# Patient Record
Sex: MALE | Race: BLACK OR AFRICAN AMERICAN | Employment: FULL TIME | URBAN - METROPOLITAN AREA
[De-identification: names, ages, dates, MRNs, and addresses within clinical notes are randomized per-mention and may not be internally consistent; named-entity substitution may affect disease eponyms.]

---

## 2021-01-20 ENCOUNTER — HOSPITAL ENCOUNTER (INPATIENT)
Age: 26
LOS: 9 days | Discharge: HOME | DRG: 885 | End: 2021-01-29
Attending: PSYCHIATRY & NEUROLOGY | Admitting: PSYCHIATRY & NEUROLOGY
Payer: COMMERCIAL

## 2021-01-20 PROBLEM — F29 PSYCHOSIS (HCC): Status: ACTIVE | Noted: 2021-01-20

## 2021-01-20 PROCEDURE — 65220000003 HC RM SEMIPRIVATE PSYCH

## 2021-01-20 RX ORDER — BENZTROPINE MESYLATE 1 MG/1
1 TABLET ORAL
Status: DISCONTINUED | OUTPATIENT
Start: 2021-01-20 | End: 2021-01-29 | Stop reason: HOSPADM

## 2021-01-20 RX ORDER — LORAZEPAM 1 MG/1
1 TABLET ORAL
Status: DISCONTINUED | OUTPATIENT
Start: 2021-01-20 | End: 2021-01-29 | Stop reason: HOSPADM

## 2021-01-20 RX ORDER — LORAZEPAM 2 MG/ML
1 INJECTION INTRAMUSCULAR
Status: DISCONTINUED | OUTPATIENT
Start: 2021-01-20 | End: 2021-01-29 | Stop reason: HOSPADM

## 2021-01-20 RX ORDER — LORAZEPAM 2 MG/ML
2 INJECTION INTRAMUSCULAR
Status: DISCONTINUED | OUTPATIENT
Start: 2021-01-20 | End: 2021-01-29 | Stop reason: HOSPADM

## 2021-01-20 RX ORDER — TRAZODONE HYDROCHLORIDE 50 MG/1
50 TABLET ORAL
Status: DISCONTINUED | OUTPATIENT
Start: 2021-01-20 | End: 2021-01-29 | Stop reason: HOSPADM

## 2021-01-20 RX ORDER — BENZTROPINE MESYLATE 1 MG/ML
1 INJECTION INTRAMUSCULAR; INTRAVENOUS
Status: DISCONTINUED | OUTPATIENT
Start: 2021-01-20 | End: 2021-01-29 | Stop reason: HOSPADM

## 2021-01-20 RX ORDER — HALOPERIDOL 5 MG/ML
5 INJECTION INTRAMUSCULAR
Status: DISCONTINUED | OUTPATIENT
Start: 2021-01-20 | End: 2021-01-29 | Stop reason: HOSPADM

## 2021-01-20 RX ORDER — ACETAMINOPHEN 325 MG/1
650 TABLET ORAL
Status: DISCONTINUED | OUTPATIENT
Start: 2021-01-20 | End: 2021-01-29 | Stop reason: HOSPADM

## 2021-01-20 RX ORDER — LORAZEPAM 1 MG/1
2 TABLET ORAL
Status: DISCONTINUED | OUTPATIENT
Start: 2021-01-20 | End: 2021-01-29 | Stop reason: HOSPADM

## 2021-01-20 RX ORDER — HALOPERIDOL 5 MG/1
5 TABLET ORAL
Status: DISCONTINUED | OUTPATIENT
Start: 2021-01-20 | End: 2021-01-29 | Stop reason: HOSPADM

## 2021-01-20 NOTE — BSMART NOTE
OCCUPATIONAL THERAPY PROGRESS NOTE  Group Time:  1400  Attendance: The patient attended full group. Participation:  The patient participated with moderate elaboration in the activity. Attention:  The patient was able to focus on the activity. Interaction:. The patient acknowledges others or responds to questions,  with no spontaneous interaction. Answers appropriate. Able to focus on activity.

## 2021-01-20 NOTE — PROGRESS NOTES
The patient was admitted to the unit via wheelchair. He is alert and orientated to time, place and situation  on admission he denied that he was having thoughts of harming himself or others. He was pleasant and calm. He denies having any pain at this time. He attended and participated in group. Patient stated that he had a fall at home yesterday, but he denies a history of falls. He was given a tour of  the unit and shown to his room. Will continue to monitor and will continue to provide support. RN will implement, initiate, develop, revise or review treatment plan.

## 2021-01-21 PROBLEM — F12.20 CANNABIS USE DISORDER, MODERATE, DEPENDENCE (HCC): Status: ACTIVE | Noted: 2021-01-21

## 2021-01-21 PROCEDURE — 99223 1ST HOSP IP/OBS HIGH 75: CPT | Performed by: PSYCHIATRY & NEUROLOGY

## 2021-01-21 PROCEDURE — 74011250637 HC RX REV CODE- 250/637: Performed by: PSYCHIATRY & NEUROLOGY

## 2021-01-21 PROCEDURE — 65220000003 HC RM SEMIPRIVATE PSYCH

## 2021-01-21 RX ADMIN — TRAZODONE HYDROCHLORIDE 50 MG: 50 TABLET ORAL at 21:38

## 2021-01-21 NOTE — BH NOTES
During this period (3pm-11pm) pt has been out in the milieu interacting pleasantly with peers and members of staff. At the beginning of the evening period pt was bizarre during interactions with staff. After gaining report with pt, pt became less wary of new staff after shift change. Pt states, \"I''m just staying positive cause no one told me I was coming to a hospital, but here I am. So I'm just gonna try and take it easy cause freaking out won't help. \" Pt is cooperative and has not required redirection from staff this period. Pt at this time does not endorse thoughts of SI nor HI. Staff will continue rounds monitoring pt for changes in behavior, location & safety.

## 2021-01-21 NOTE — GROUP NOTE
[facility] GROUP DOCUMENTATION INDIVIDUAL                                                                          Group Therapy Note    Date: 1/21/2021    Group Start Time: 0815  Group End Time: 0830  Group Topic: Nursing    [address] 1 SPECIAL TRTMT 1    Delilah Hurtado RN    IP [facility] GROUP DOCUMENTATION GROUP    Group Therapy Note    Attendees: 6         Attendance: Attended    Patient's Goal:  Coping Skills    Interventions/techniques: Informed    Follows Directions:  Followed directions    Interactions: Interacted appropriately    Mental Status: Calm    Behavior/appearance: Cooperative    Goals Achieved: Able to engage in interactions and Able to listen to others          Randalyn Gilford, RN

## 2021-01-21 NOTE — BH NOTES
The patient was monitored for safety. Affect was calm and compliant to the unit rules. Pt was very social towards his peers and the staff. Pt was able to talk with his DR and SW. No issues with any behavioral problems. Pt was educated on having to attend court tomorrow. Pt also was able to eat all meals. Pt attended his groups and activities. Staff will continue to monitor for safety and locations.
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
## 2021-01-21 NOTE — PROGRESS NOTES
Problem: Psychosis  Goal: *STG: Decreased hallucinations  Description: there will be a decrease in delusional thinking will have a decrease in delusional  thinking. Outcome: Progressing Towards Goal  Goal: *STG: Remains safe in hospital  Outcome: Progressing Towards Goal  Goal: *STG: Participates in individual and group therapy  Outcome: Progressing Towards Goal       Pt calm cooperative, euthymic. No c/o si/hi or avh. Has been interacting with peers and staff appropriately.   Has been compliant with treatment plan

## 2021-01-21 NOTE — BSMART NOTE
BH Biopsychosocial Assessment    Current Level of Psychosocial Functioning     [x]Independent  []Dependent  []Minimal Assist      Comments:      Psychosocial High Risk Factors (check all that apply)      []Unable to obtain meds                                                               []Chronic illness/pain    []Substance abuse   []Lack of Family Support   []Financial stress   []Isolation   []Inadequate Community Resources  [x]Suicide attempt(s)  []Not taking medications   []Victim of crime   []Developmental Delay  []Unable to manage personal needs    []Age 72 or older   []  Homeless  []Neli transportation   []Readmission within 30 days  []Unemployment  []Traumatic Event    Psychiatric Advanced Directive: None reported by patient     Family to involve in treatment: None reported     Sexual Orientation: Patient reports he is heterosexual.      Patient Strengths: Unknown    Patient Barriers: Patient remains guarded    Opiate education provided: N/A    Safety plan: Patient is unable to contract for safety     CMHC/MH history: None reported by patient     Plan of Care:  medication management, group/individual therapies, family meetings, psycho -education, treatment team meetings to assist with stabilization    Initial Discharge Plan: Ladan Rolle will determine outcome    Clinical Summary: 25-year-old male with history of asthma who was brought by  under an [address] [place] [address]. Per nurse, patient's mother requested ECO because patient is hearing voices and had an angry demeanor towards her. Patient was rather evasive during interview. Patient is hesitant to repeat the reason for his admission and is hopeful he will discharge tomorrow. SW will continue to assist as needed.     Alexsandra Murrieta, MARLYN

## 2021-01-21 NOTE — BSMART NOTE
ART THERAPY GROUP PROGRESS NOTE    PATIENT SCHEDULED FOR GROUP AT: 1300    ATTENDANCE: Full    PARTICIPATION LEVEL: Participates fully in the art process    ATTENTION LEVEL : Able to focus on task    FOCUS: Boundaries    SYMBOLIC & THEMATIC CONTENT AS NOTED IN IMAGERY: He was calm, compliant, and invested. He occasionally interacted with peers and thought process was linear.
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
## 2021-01-21 NOTE — BSMART NOTE
OCCUPATIONAL THERAPY PROGRESS NOTE  Group Time:  1400  Attendance: The patient attended full group. Participation:  The patient participated fully in the activity. .  Attention:. The patient needed redirection to activity/topic at least once. Interaction:  The patient frequently interacts with others. Mood brighter, some statements presented possibly only partially factual.  Interacting easily with peers.

## 2021-01-22 PROBLEM — F20.81 SCHIZOPHRENIFORM DISORDER (HCC): Status: ACTIVE | Noted: 2021-01-22

## 2021-01-22 PROCEDURE — 74011250637 HC RX REV CODE- 250/637: Performed by: PSYCHIATRY & NEUROLOGY

## 2021-01-22 PROCEDURE — 65220000003 HC RM SEMIPRIVATE PSYCH

## 2021-01-22 PROCEDURE — 99232 SBSQ HOSP IP/OBS MODERATE 35: CPT | Performed by: PSYCHIATRY & NEUROLOGY

## 2021-01-22 RX ORDER — RISPERIDONE 1 MG/1
2 TABLET, FILM COATED ORAL
Status: DISCONTINUED | OUTPATIENT
Start: 2021-01-22 | End: 2021-01-26

## 2021-01-22 RX ADMIN — RISPERIDONE 2 MG: 1 TABLET ORAL at 20:17

## 2021-01-22 NOTE — BH NOTES
The patient was monitored for safety. Affect was irritable and upset about not being discharged from the court. Pt processed with the staff but continues to be upset. Pt did not want to socialize with his peers and the staff. Pt only ate his breakfast but did not eat his lunch. Pt did talk with his DR and SW. Staff will continue to monitor for safety and locations.

## 2021-01-22 NOTE — PROGRESS NOTES
Behavioral Health Progress Note    Admit Date: 1/20/2021  Hospital day 2    Vitals :   Patient Vitals for the past 8 hrs:   BP Temp Pulse Resp   01/22/21 0756 111/69 97.7 °F (36.5 °C) 63 18     Labs:  No results found for this or any previous visit (from the past 24 hour(s)). Meds:   Current Facility-Administered Medications   Medication Dose Route Frequency    risperiDONE (RisperDAL) tablet 2 mg  2 mg Oral QHS    haloperidoL (HALDOL) tablet 5 mg  5 mg Oral Q4H PRN    haloperidol lactate (HALDOL) injection 5 mg  5 mg IntraMUSCular Q4H PRN    LORazepam (ATIVAN) tablet 1 mg  1 mg Oral Q4H PRN    LORazepam (ATIVAN) tablet 2 mg  2 mg Oral Q4H PRN    LORazepam (ATIVAN) injection 2 mg  2 mg IntraMUSCular Q4H PRN    LORazepam (ATIVAN) injection 1 mg  1 mg IntraMUSCular Q4H PRN    traZODone (DESYREL) tablet 50 mg  50 mg Oral QHS PRN    benztropine (COGENTIN) injection 1 mg  1 mg IntraMUSCular Q4H PRN    benztropine (COGENTIN) tablet 1 mg  1 mg Oral Q4H PRN    acetaminophen (TYLENOL) tablet 650 mg  650 mg Oral Q6H PRN      Hospital Problems: Principal Problem:    Psychosis ([facility] (1/20/2021)    Active Problems:    Cannabis use disorder, moderate, dependence ([facility] (1/21/2021)        Subjective:   Medication side effects: none  dry mouth    Mental Status Exam  Sensorium: alert  Orientation: only aware of  time  Relations: guarded  Eye Contact: appropriate  Appearance: shows no evidence of impairment  Thought Process: normal rate of thoughts   Thought Content: paranoia   Suicidal: denies   Homicidal: denies   Mood: is irritable   Affect: odd, blunted  Memory: is impaired and is recent     Concentration: distractable  Abstraction: concrete  Insight: The patient shows little insight    OR Fair  Judgement: is psychologically impaired OR  Fair    Assessment/Plan:   not changed    Continue close observation  The patient is seen in coverage for Dr. Bradford Minor. Nurses report that he has been somewhat irritable.   He went to temporary custodial order hearing and was involuntarily committed to the hospital.  He had been talking on unit to peers about using marijuana routinely but then apparently told the  in hearing that he does not smoke marijuana. His drug screen was positive for marijuana. He says that he was not planning on using the handgun that was taken away from him. He was saying he just carries it around all the time. He had been hearing something call out his brother's name then when I asked him about this he says he no longer hears this. He had been talking of strange behaviors saying that people would sniff when they were giving codes. He is now trying to normalize this saying that when people get upset they will sniff in deeply trying to get more oxygen to the brain. He is now denying thoughts of harming self and denies auditory hallucinations. He was telling a story of how he was in trouble because he had \"got a lot of attention on social media\" though he cannot explain why he had been getting this attention. He did say that he continues to hear his mother say things about him behind his back and says that he knows that she says things about his brother behind his back also. The patient had previously refused to take antipsychotic medication. I did explain to him that he would need to be stabilized on antipsychotic medications in order to work on discharge. I written for risperidone 2 mg at bedtime and he said he would take this to try to make it easier to get out of the hospital.  I did discuss risks and benefits of the medication. He did not want medicine saying that there is nothing wrong with him but did agree to take it. Initiate risperidone 2 mg at bedtime.

## 2021-01-22 NOTE — BSMART NOTE
Clinical Summary: 29-year-old male with history of asthma who was brought by  under an [address] [facility].  Per nurse, patient's mother requested ECO because patient is hearing voices and had an angry demeanor towards her. SW made contact with patient as he engaged with peers in the milieu. Patient reports he is doing ok and is beginning to accept the courts decision to detain him for treatment. Patient continues to remain guarded and is vague with the explanation of his admission. Patient is hesitant about allowing this writer to contact family. Patient is encouraged to comply with treatment, psycho education and positive peer interaction. SW will continue to process with patient utilizing supportive counseling and effective behavior management tools.       Connie Cain, LCSW-E

## 2021-01-22 NOTE — GROUP NOTE
ANN  GROUP DOCUMENTATION INDIVIDUAL                                                                          Group Therapy Note    Date: 1/22/2021    Group Start Time: 0900  Group End Time: 4052  Group Topic: Nursing    [facility] SPECIAL TRTMT Rodolfo Burkett 124, RN    IP [facility] GROUP DOCUMENTATION GROUP    Group Therapy Note    Attendees: 2         Attendance: Did not attend            Xavi Gutierrez RN

## 2021-01-22 NOTE — BSMART NOTE
ART THERAPY GROUP PROGRESS NOTE    PATIENT SCHEDULED FOR GROUP AT: 1250    ATTENDANCE: Full    PARTICIPATION LEVEL: Participates fully in the art process    ATTENTION LEVEL : Able to focus on task    FOCUS: Treatment goals    SYMBOLIC & THEMATIC CONTENT AS NOTED IN IMAGERY: He was more engaged than previous art therapy group. He was invested in the task at hand. He did participate in group discussion when encouraged and his responses were vague and he did not elaborate.

## 2021-01-22 NOTE — BSMART NOTE
OCCUPATIONAL THERAPY PROGRESS NOTE  Group Time:  1400  Attendance: The patient attended full group. .  Participation:  The patient participated with minimal elaboration in the activity. Attention:  The patient was able to focus on the activity. .  Interaction:. The patient occasionally  interacts with others. Quieter today. Participated primarily as called on with little elaboration or disclosure.

## 2021-01-23 PROCEDURE — 74011250637 HC RX REV CODE- 250/637: Performed by: PSYCHIATRY & NEUROLOGY

## 2021-01-23 PROCEDURE — 65220000003 HC RM SEMIPRIVATE PSYCH

## 2021-01-23 RX ADMIN — TRAZODONE HYDROCHLORIDE 50 MG: 50 TABLET ORAL at 20:24

## 2021-01-23 RX ADMIN — RISPERIDONE 2 MG: 1 TABLET ORAL at 20:23

## 2021-01-23 RX ADMIN — BENZTROPINE MESYLATE 1 MG: 1 TABLET ORAL at 10:36

## 2021-01-23 NOTE — BH NOTES
Dull flat sad depressed reserved isolative withdrawn anxious paranoid cooperative stays to self through most of shift. Interacts very little with staff and or peers. Will continue to monitor and support.

## 2021-01-23 NOTE — PROGRESS NOTES
[facility]     Physician Daily Progress Note    Patient:  Dallin Coronado Age:  22 y.o. :  1995     SEX:  male MRN:  [id number] CSN:  [id number]    Admit Date:  2021     DSM 5 Diagnosis  Patient Active Problem List   Diagnosis Code    Psychosis ([facility] F29    Cannabis use disorder, moderate, dependence ([facility] F12.20    Schizophreniform disorder ([facility] F20.81         Subjective:   Patient seen for follow-up. Admission H &P and interval history noted. Nursing notes and current meds reviewed. He states tat he needed o be away from his family. He was admitted under TDO from [facility] for aggressive and paranoid behavior. He thinks he does not have any mental lucho issues. He is accepting Risperdal but states it is no different.     Current Medications:    Current Facility-Administered Medications   Medication Dose Route Frequency Provider Last Rate Last Admin    risperiDONE (RisperDAL) tablet 2 mg  2 mg Oral QHS Marcial Barnes MD   2 mg at 21    haloperidoL (HALDOL) tablet 5 mg  5 mg Oral Q4H PRN Marcial Barnes MD        haloperidol lactate (HALDOL) injection 5 mg  5 mg IntraMUSCular Q4H PRN Marcial Barnes MD        LORazepam (ATIVAN) tablet 1 mg  1 mg Oral Q4H PRN Marcial Barnes MD        LORazepam (ATIVAN) tablet 2 mg  2 mg Oral Q4H PRN Marcial Barnes MD        LORazepam (ATIVAN) injection 2 mg  2 mg IntraMUSCular Q4H PRN Marcial Barnes MD        LORazepam (ATIVAN) injection 1 mg  1 mg IntraMUSCular Q4H PRN Marcial Barnes MD        traZODone (DESYREL) tablet 50 mg  50 mg Oral QHS PRN Marcial Barnes MD   50 mg at 21    benztropine (COGENTIN) injection 1 mg  1 mg IntraMUSCular Q4H PRN Marcial Barnes MD        benztropine (COGENTIN) tablet 1 mg  1 mg Oral Q4H PRN Marcial Barnes MD   1 mg at 21 1036    acetaminophen (TYLENOL) tablet 650 mg  650 mg Oral Q6H PRN Marcial Barnes MD Compliant with medication:  Yes     Side effects from medications:  No    Mental Status Exam    Appearance    General Behavior   Disheveled      Speech form and content,  Language  Associations  Form of Thought   Normal flow and volume  TP : Disorganized    Mood, Affect  Self-Attitude  Vital Sense  SI/HI/PDW   Irritable  No SI, HI, hopelessness   Abnormal Perceptions and illusions   AH's    Delusions   Paranoia   Anxiety    Denies   COGNITION Intelligence Abstraction   Intact   Judgement Insight     Limited      Medical:             Medical:     Visit Vitals  /81   Pulse (!) 53   Temp 97.1 °F (36.2 °C)   Resp 18   Ht 5' 5\" (1.651 m)   Wt 54 kg (119 lb)   BMI 19.80 kg/m²       No results found for this or any previous visit (from the past 24 hour(s)). Recommendation and Plan  Treatment Plan  1. Continue current treatment modalities? If no, state rationale and address changes in Treatment Plan under Optional Sections. Yes  2. Continue current medications? If no, state rationale and address changes in Medications under Optional Sections. Yes  3. Referrals or Consultations needed? No  4. Discharge Planning: Needs continues hospitalization for stabilization. I certify that this patient's inpatient psychiatric hospital services furnished since the previous certification were, and continue to be, required for treatment that could reasonably be expected to improve the patient's condition, or for diagnostic study, and that the patient continues to need, on a daily basis, active treatment furnished directly by or requiring the supervision of inpatient psychiatric facility personnel. In addition the hospital records show that services furnished were intensive treatment services, admission or related services, or equivalent services.        Signed By: Parish Crowder MD     1/23/2021

## 2021-01-23 NOTE — PROGRESS NOTES
Problem: Falls - Risk of  Goal: *Absence of Falls  Description: Document Green Salvia Fall Risk and appropriate interventions in the flowsheet. Outcome: Progressing Towards Goal  Note: Fall Risk Interventions:                           Aeb pt free of falls this shift     Problem: Psychosis  Goal: *STG: Remains safe in hospital  Outcome: Progressing Towards Goal  Note: Aeb pt free of harm this shfit     Problem: Paranoid Ideation  Goal: *LTG: Interact with others without defensiveness or anger  Outcome: Progressing Towards Goal  Note: Interacting appropriately with staff and peers     Pt calm and cooperative. Compliant with meds and has not required PRNs. Exhibits poor insight, does not feel that he needs to be here. Denies SI/HI/AVH at this time, will continue to monitor for safety.

## 2021-01-23 NOTE — BH NOTES
Patient was calm, cooperative, and pleasant during shift. Patient attended groups and interacted appropriately with peers and staff. Will continue to monitor.

## 2021-01-23 NOTE — PROGRESS NOTES
Problem: Falls - Risk of  Goal: *Absence of Falls  Description: Document Shalini Renee Fall Risk and appropriate interventions in the flowsheet. Outcome: Progressing Towards Goal  Note: Fall Risk Interventions:                                Problem: Psychosis  Goal: *STG: Decreased hallucinations  Description: there will be a decrease in delusional thinking will have a decrease in delusional  thinking.   Outcome: Progressing Towards Goal  Goal: *STG: Decreased delusional thinking  Outcome: Progressing Towards Goal

## 2021-01-24 PROCEDURE — 74011250637 HC RX REV CODE- 250/637: Performed by: PSYCHIATRY & NEUROLOGY

## 2021-01-24 PROCEDURE — 65220000003 HC RM SEMIPRIVATE PSYCH

## 2021-01-24 RX ORDER — BENZTROPINE MESYLATE 1 MG/1
1 TABLET ORAL 2 TIMES DAILY
Status: DISCONTINUED | OUTPATIENT
Start: 2021-01-24 | End: 2021-01-26

## 2021-01-24 RX ADMIN — BENZTROPINE MESYLATE 1 MG: 1 TABLET ORAL at 21:25

## 2021-01-24 RX ADMIN — BENZTROPINE MESYLATE 1 MG: 1 TABLET ORAL at 09:36

## 2021-01-24 RX ADMIN — RISPERIDONE 2 MG: 1 TABLET ORAL at 21:25

## 2021-01-24 NOTE — GROUP NOTE
ANN  GROUP DOCUMENTATION INDIVIDUAL                                                                          Group Therapy Note    Date: 1/23/2021    Group Start Time: 1915  Group End Time: 1930  Group Topic: Nursing    [facility] SPECIAL TRTMT 1    Zeke Merino RN    IP [address] GROUP DOCUMENTATION GROUP    Group Therapy Note    Attendees: 6         Attendance: Attended      Interventions/techniques: Informed and Promoted peer support    Follows Directions:  Followed directions    Interactions: Interacted appropriately    Mental Status: Calm    Behavior/appearance: Cooperative    Goals Achieved: Able to listen to others          Nelly Raphael RN

## 2021-01-24 NOTE — PROGRESS NOTES
[facility] [facility]     Physician Daily Progress Note    Patient:  Domi Singleton Age:  22 y.o. :  1995     SEX:  male MRN:  [id number] CSN:  [id number]    Admit Date:  2021     DSM 5 Diagnosis  Patient Active Problem List   Diagnosis Code    Psychosis ([facility] F29    Cannabis use disorder, moderate, dependence ([facility] F12.20    Schizophreniform disorder ([facility] F20.81         Subjective:   22year old AA male with h/o psychosis, most likely schizophrenia. Patient seen for follow-up. Admission H &P and interval history noted. Nursing notes and current meds reviewed. He states tat he needed o be away from his family. He was admitted under TDO from [facility] for aggressive and paranoid behavior. He thinks he does not have any mental lucho issues. He is accepting Risperdal but states it is no different. He reports being \" sore\". Will add cogentin for EPS. He denies AVH today.      Current Medications:    Current Facility-Administered Medications   Medication Dose Route Frequency Provider Last Rate Last Admin    risperiDONE (RisperDAL) tablet 2 mg  2 mg Oral QHS Shelly Mayen MD   2 mg at 21    haloperidoL (HALDOL) tablet 5 mg  5 mg Oral Q4H PRN Shelly Mayen MD        haloperidol lactate (HALDOL) injection 5 mg  5 mg IntraMUSCular Q4H PRN Shelly Mayen MD        LORazepam (ATIVAN) tablet 1 mg  1 mg Oral Q4H PRN Shelly Mayen MD        LORazepam (ATIVAN) tablet 2 mg  2 mg Oral Q4H PRN Shelly Mayen MD        LORazepam (ATIVAN) injection 2 mg  2 mg IntraMUSCular Q4H PRN Shelly Mayen MD        LORazepam (ATIVAN) injection 1 mg  1 mg IntraMUSCular Q4H PRN Shelly Mayen MD        traZODone (DESYREL) tablet 50 mg  50 mg Oral QHS PRN Shelly Mayen MD   50 mg at 21    benztropine (COGENTIN) injection 1 mg  1 mg IntraMUSCular Q4H PRN Shelly Mayen MD        benztropine (COGENTIN) tablet 1 mg  1 mg Oral Q4H PRN Yumiko Ruiz MD   1 mg at 01/23/21 1036    acetaminophen (TYLENOL) tablet 650 mg  650 mg Oral Q6H PRN Yumiko Ruiz MD             Compliant with medication:  Yes     Side effects from medications:  No    Mental Status Exam    Appearance    General Behavior   Disheveled      Speech form and content,  Language  Associations  Form of Thought   Normal flow and volume  TP : Disorganized    Mood, Affect  Self-Attitude  Vital Sense  SI/HI/PDW   Irritable  No SI, HI, hopelessness   Abnormal Perceptions and illusions   AH's    Delusions   Paranoia   Anxiety    Denies   COGNITION Intelligence Abstraction   Intact   Judgement Insight     Limited      Medical:             Medical:     Visit Vitals  /84 (BP 1 Location: Right arm, BP Patient Position: Sitting)   Pulse 72   Temp 97.4 °F (36.3 °C)   Resp 18   Ht 5' 5\" (1.651 m)   Wt 54 kg (119 lb)   BMI 19.80 kg/m²       No results found for this or any previous visit (from the past 24 hour(s)). Recommendation and Plan  Treatment Plan  1. Continue current treatment modalities? If no, state rationale and address changes in Treatment Plan under Optional Sections. Yes  2. Continue current medications? If no, state rationale and address changes in Medications under Optional Sections. Yes  3. Referrals or Consultations needed? No  4. Discharge Planning: Needs continues hospitalization for stabilization. I certify that this patient's inpatient psychiatric hospital services furnished since the previous certification were, and continue to be, required for treatment that could reasonably be expected to improve the patient's condition, or for diagnostic study, and that the patient continues to need, on a daily basis, active treatment furnished directly by or requiring the supervision of inpatient psychiatric facility personnel.  In addition the hospital records show that services furnished were intensive treatment services, admission or related services, or equivalent services.        Signed By: Wilder Garner MD     1/24/2021

## 2021-01-25 PROCEDURE — 65220000003 HC RM SEMIPRIVATE PSYCH

## 2021-01-25 PROCEDURE — 74011250637 HC RX REV CODE- 250/637: Performed by: PSYCHIATRY & NEUROLOGY

## 2021-01-25 RX ADMIN — BENZTROPINE MESYLATE 1 MG: 1 TABLET ORAL at 08:00

## 2021-01-25 RX ADMIN — BENZTROPINE MESYLATE 1 MG: 1 TABLET ORAL at 20:09

## 2021-01-25 RX ADMIN — RISPERIDONE 2 MG: 1 TABLET ORAL at 20:08

## 2021-01-25 NOTE — BSMART NOTE
SOCIAL WORK GROUP THERAPY PROGRESS NOTE    Group Time:  10:15am      Group Topic:  Coping Skills    C D Issues    Group Participation:      Pt moderately involved during group discussion but remained attentive. Didn't seem as suspicious as a couple days ago. Monika, hoping for d/c today. Explained to him role of his Dr on his tx team. Bit grandiose at times. Discussion included the process of making \"Change\" by answering questions on handout with an emphasis on strengths & weaknesses to support improving one's self esteem. Did very good with handout. He identified several positives about what he's accomplished so far in life as well as compliments he's received. Differences between Assertive, Aggressive & Non-Assertive Behaviors also looked at.

## 2021-01-25 NOTE — GROUP NOTE
ANN  GROUP DOCUMENTATION INDIVIDUAL                                                                          Group Therapy Note    Date: 1/24/2021    Group Start Time: 2000  Group End Time: 2030  Group Topic: Medication    [facility] SPECIAL TRT 1    Yamileth Carl RN    IP [facility] [facility] GROUP DOCUMENTATION GROUP    Group Therapy Note    Attendees: 8         Attendance: Attended      Interventions/techniques: Informed    Follows Directions:  Followed directions    Interactions: Interacted appropriately    Mental Status: Calm    Behavior/appearance: Cooperative    Goals Achieved: Able to listen to others        Brittnee Reynolds RN

## 2021-01-25 NOTE — PROGRESS NOTES
[address] [facility] [address]  Inpatient Progress Note     Date of Service: 01/25/21  Hospital Day: 5     Subjective/Interval History   01/25/21    Treatment Team Notes:  Notes reviewed and/or discussed and report that Erick Goss is a 49-year-old male admitted under involuntary commitment for paranoid and aggressive behavior. Per nursing report, patient has been pleasant and has been compliant with medications. He is visible on the unit and interacting with peers appropriately. Patient interview: Erick Goss was interviewed by this writer today. Patient denies complaints. He does not feel that he needs to be in the hospital.  He says he is taking the medication but has not noticed any difference. He feels relaxed and says he is making the most of his time in the hospital.  He has limited insight into his paranoia. He says it is not paranoia it is more of self-awareness. His mood is stable. He denies suicidal or homicidal ideation. He continues to complain of poor sleep with multiple awakenings. He is also complaining of some pain in both shoulders since starting Risperdal.  Cogentin was added. He is requesting discharge and is anxious to be discharged. Patient was still advised on need to stop smoking marijuana. Objective     Visit Vitals  /78   Pulse (!) 59   Temp 97.5 °F (36.4 °C)   Resp 18   Ht 5' 5\" (1.651 m)   Wt 54 kg (119 lb)   BMI 19.80 kg/m²       No results found for this or any previous visit (from the past 24 hour(s)). Mental Status Examination     Appearance/Hygiene 22 y.o.  BLACK OR  male  Hygiene: Fair   Behavior/Social Relatedness Appropriate   Musculoskeletal Gait/Station: appropriate  Tone (flaccid, cogwheeling, spastic): not assessed  Psychomotor (hyperkinetic, hypokinetic): calm   Involuntary movements (tics, dyskinesias, akathisa, stereotypies): none   Speech   Rate, rhythm, volume, fluency and articulation are appropriate   Mood euthymic   Affect    congruent   Thought Process Linear and goal directed   Thought Content and Perceptual Disturbances Denies self-injurious behavior (SIB), suicidal ideation (SI), aggressive behavior or homicidal ideation (HI)    Denies auditory and visual hallucinations   Sensorium and Cognition  Grossly intact   Insight  Limited   Judgment  Limited        Assessment/Plan      Psychiatric Diagnoses:   Patient Active Problem List   Diagnosis Code    Psychosis ([facility] F29    Cannabis use disorder, moderate, dependence ([facility] F12.20    Schizophreniform disorder ([facility] F20.81       Level of impairment/disability: Mild to moderate    Gee Wheeler is a 22 y.o. who is currently admitted for paranoia and seems to be doing better. 1.  Continue Risperdal 2 mg at bedtime for paranoia and Cogentin 1 mg twice daily for EPS prevention. 2.  Reviewed instructions, risks, benefits and side effects of medications. 3.  Patient encouraged to abstain from smoking marijuana  4. Disposition/Discharge Date: self-care, home. Possible discharge tomorrow.     MD DR. GUILLAUME [facility]  Psychiatry

## 2021-01-25 NOTE — PROGRESS NOTES
Problem: Psychosis  Goal: *STG: Decreased hallucinations  Description: there will be a decrease in delusional thinking will have a decrease in delusional  thinking. Outcome: Progressing Towards Goal  Note: Denied AVH. Goal: *STG: Remains safe in hospital  Outcome: Progressing Towards Goal  Note: Remains safe. Patient has been sitting in day area quiet and isolated to self. Patient is responsive when spoken to by staff. Patient reported having an [place] day. \"  Patient appears suspicious. Patient denied AVH at current, Ayesha Solorio when someone is talking or the tv is on.\"  Patient has been medication compliant. Will continue to monitor and support as needed.

## 2021-01-25 NOTE — BH NOTES
The patient was monitored for safety. Affect was calm and compliant. Pt was very social towards his peers and the staff. Pt encouraged to continue to verbalize all issues. Pt did talk with his DR and SW. No issues with any behavioral problems. Pt did attend his groups and activities. Staff will continue to monitor for safety and locations.

## 2021-01-25 NOTE — BSMART NOTE
SW Contact:      Clinical Summary: 22year-old male with history of asthma who was brought by  under an ECO.  Per nurse, patient's mother requested ECO because patient is hearing voices and had an an angry demeanor towards her. 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 He related how life was ok until he relocated from [place], [place] May 2020 due to no job and Covid-19. Felt things were ok until this recent misunderstanding with his brother that he's blamed for and law enforcement intervening. We discussed how frustration, disappointment & hurt can trigger resentments and / or anger but pt denied that's the case and again nothing else specific. Commented felt parents supportive. Explained to pt FT with parents before d/c would be helpful to open up communication, make sure all on same page & give him opportunity to express his frustrations with returning home. Suggested writing agenda tonight for FT tomorrow. Pt briefly processed & said wanted to do now. Which we did. Parents were called & both on phone. Initially session started generic but quickly pt's mood changed & he started being accusatory of parents, especially mom of \" holding a grudge & rude\". He continued to vent about her lack of honesty & others in family talking behind her back because of her ways. Mom patiently listened & commented not sure what that all about. As dad was supportive of mom pt kept interrupting him & then vented anger with dad for telling police pt owned weapon.  Dad made clear, \"they asked if you did & I wasn't about to lie & they now have weapon with you still able to retrieve with proper documentation\". Pt from there just kept going back & forth about same issues. Pt remained argumentative & unwilling to discuss \"positive change\" for ALL to work on. Session ended with parents politely reminding pt to slow down, use time at hospital wisely, reassess his comments and be respectful. Parents hoping to have follow up session before d/c. Pt agreed to process above with evening staff. Dr & tx team updated.

## 2021-01-25 NOTE — PROGRESS NOTES
Problem: Psychosis  Goal: *STG: Decreased hallucinations  Description: there will be a decrease in delusional thinking will have a decrease in delusional  thinking. Outcome: Progressing Towards Goal  Goal: *STG: Decreased delusional thinking  Outcome: Progressing Towards Goal  Goal: *STG: Remains safe in hospital  Outcome: Progressing Towards Goal  Goal: *STG: Participates in individual and group therapy  Outcome: Progressing Towards Goal   Patient is pleasant, denies SI, A/V hallucinations. States he does not feel paranoid. Medication compliant. Attending groups.

## 2021-01-26 PROCEDURE — 74011250637 HC RX REV CODE- 250/637: Performed by: PSYCHIATRY & NEUROLOGY

## 2021-01-26 PROCEDURE — 99232 SBSQ HOSP IP/OBS MODERATE 35: CPT | Performed by: PSYCHIATRY & NEUROLOGY

## 2021-01-26 PROCEDURE — 65220000003 HC RM SEMIPRIVATE PSYCH

## 2021-01-26 RX ORDER — BENZTROPINE MESYLATE 1 MG/1
2 TABLET ORAL
Status: DISCONTINUED | OUTPATIENT
Start: 2021-01-26 | End: 2021-01-27

## 2021-01-26 RX ORDER — TRAZODONE HYDROCHLORIDE 50 MG/1
100 TABLET ORAL
Qty: 30 TAB | Refills: 0 | Status: SHIPPED | OUTPATIENT
Start: 2021-01-26

## 2021-01-26 RX ORDER — BENZTROPINE MESYLATE 2 MG/1
2 TABLET ORAL
Qty: 30 TAB | Refills: 0 | Status: SHIPPED | OUTPATIENT
Start: 2021-01-26 | End: 2021-01-29

## 2021-01-26 RX ORDER — RISPERIDONE 2 MG/1
2 TABLET, FILM COATED ORAL
Qty: 30 TAB | Refills: 0 | Status: SHIPPED | OUTPATIENT
Start: 2021-01-26 | End: 2021-01-29

## 2021-01-26 RX ADMIN — BENZTROPINE MESYLATE 1 MG: 1 TABLET ORAL at 08:13

## 2021-01-26 RX ADMIN — RISPERIDONE 3 MG: 2 TABLET ORAL at 21:01

## 2021-01-26 NOTE — PROGRESS NOTES
Problem: Psychosis  Goal: *STG: Decreased hallucinations  Description: there will be a decrease in delusional thinking will have a decrease in delusional  thinking. Outcome: Progressing Towards Goal  Goal: *STG: Decreased delusional thinking  Outcome: Progressing Towards Goal  Goal: *STG: Remains safe in hospital  Outcome: Progressing Towards Goal   Patient is attending groups, medication compliant. Denies paranoid feelings. Patient expressed anxiety when his discharge was cancelled. Patient was able to process.

## 2021-01-26 NOTE — PROGRESS NOTES
[facility] [address]  Inpatient Progress Note     Date of Service: 01/26/21  Hospital Day: 6     Subjective/Interval History   01/26/21    Treatment Team Notes:  Notes reviewed and/or discussed and report that Dannielle Hilton is a 80-year-old male admitted under involuntary commitment for paranoid and aggressive behavior. Per nursing report, patient has been pleasant and has been compliant with medications. He is visible on the unit and interacting with peers appropriately. Patient interview: Dannielle Hilton was interviewed by this writer today. Patient was requesting discharge. Collateral information was obtained from therapist who met with him yesterday indicated that patient was very labile during family session with his parents over the phone yesterday. He was still paranoid and verbally aggressive towards his parents. According to treatment team, patient is not ready for discharge. Nursing also reported that patient has been observed talking and laughing to himself. His parents were contacted today and they felt that nothing has improved or change in his demeanor since date of admission. He reported that he is very aggressive,  hostile and disrespectful on the phone, using profanity,  and this is new behavior that they have never observed before. Parents state he is \"way over the top\" and are very concerned for potential for violence due to return home to the current state. He said he is still paranoid towards his mother and accusing her of talking behind his back about him and other family members. Nevertheless, patient presents calmly during interviews. His thought process is linear and organized. He definitely has no insight into his mental illness and why he is here. He is still paranoid about his mother and unable to take any responsibility for events that led to his hospitalization.   He says he will try to maintain peace at home by just not talking or interacting with his parents or brothers. He denies that he is paranoid and says his thinking is rational.    Objective     Visit Vitals  /76   Pulse 77   Temp 98.8 °F (37.1 °C)   Resp 14   Ht 5' 5\" (1.651 m)   Wt 54 kg (119 lb)   BMI 19.80 kg/m²       No results found for this or any previous visit (from the past 24 hour(s)). Mental Status Examination     Appearance/Hygiene 22 y.o. BLACK OR  male  Hygiene: Fair   Behavior/Social Relatedness Appropriate   Musculoskeletal Gait/Station: appropriate  Tone (flaccid, cogwheeling, spastic): not assessed  Psychomotor (hyperkinetic, hypokinetic): calm   Involuntary movements (tics, dyskinesias, akathisa, stereotypies): none   Speech   Rate, rhythm, volume, fluency and articulation are appropriate   Mood   euthymic   Affect    congruent   Thought Process Linear and goal directed   Thought Content and Perceptual Disturbances Denies self-injurious behavior (SIB), suicidal ideation (SI), aggressive behavior or homicidal ideation (HI)    Denies auditory and visual hallucinations   Sensorium and Cognition  Grossly intact   Insight  Limited   Judgment  Limited        Assessment/Plan      Psychiatric Diagnoses:   Patient Active Problem List   Diagnosis Code    Psychosis ([facility] F29    Cannabis use disorder, moderate, dependence ([facility]) F12.20    Schizophreniform disorder ([facility] F20.81       Level of impairment/disability: Mild to moderate    Alvino Soto is a 22 y.o. who is currently admitted for paranoia and seems to be doing better. 1.  Increase Risperdal to 3 mg at bedtime. 2.  Reviewed instructions, risks, benefits and side effects of medications. 3.  Patient encouraged to abstain from smoking marijuana  4. Disposition/Discharge Date: self-care, home. Another family session with parents prior to discharge.     Kavitha Mccormack MD DR. [facility]  Psychiatry

## 2021-01-26 NOTE — BH NOTES
Patient presented himself as quiet and reserved, but very engaging when approached. At the start of this shift, patient retreated to his room and kept himself isolated from the milieu. Once patient was informed that an evening group would be taking place, he voluntarily returned to the milieu. Patient participated during group, and shared his personal experiences as well as insight on the subject matter of [employer]. Patient remained pleasant, cooperative, and compliant with treatment recommendations made. No issues to report. Patient will continue to be monitored by staff for any change in mood and behavior.

## 2021-01-26 NOTE — BSMART NOTE
Clinical Summary: 22year-old male with history of asthma who was brought by  under an ECO.  Per nurse, patient's mother requested ECO because patient is hearing voices and had an angry demeanor towards her. SW made contact with patient as he remained isolated in the milieu. Patient is agitated and irritated as he is informed his discharge hs been cancelled. Patient is informed that treatment must progress due to his bizarre behaviors. It is reported patient has been observed responding to internal stimuli. Patient confirms his reasoning behind informing his mother that someone is watching her due to the person in the neighborhood watch. Patient denies the need for treatment. He reports people are just \"assholes\". Patient is agitated as this writer addressed concerns about his relationship with his mother. Patient reports the relationship has changed and he is prepared to remain in his room upon discharge. SW Collateral:  SW made contact with parents  & Mrs. Julian Ceballos ([phone]). They reports concerns regarding patients discharge. They report patient continues to express bizarre behaviors. Mother reports patient has been different and states she did not recognize the behaviors of the son which was presented in the family session yesterday. LOKI along with treating psychiatrist Dr. Madelin Sevilla addressed parents concerns and informed them of disposition moving forward. SW will continue to monitor patient and will assist with discharge. SW made contact with reinvestment care manager [facility]) who reports patient will have services upon discharge. SW addressed discharge plan with parent and patient and will continue with supportive counseling as needed.     MARLYN Mendoza

## 2021-01-26 NOTE — GROUP NOTE
ANN  GROUP DOCUMENTATION INDIVIDUAL                                                                          Group Therapy Note    Date: 1/25/2021    Group Start Time: 2000  Group End Time: 2030  Group Topic: Nursing    [facility] SPECIAL TRTMT 1    Marybeth JUAREZ  GROUP DOCUMENTATION GROUP    Group Therapy Note    Attendees: 5         Attendance: Attended        Interventions/techniques: Informed    Follows Directions:  Followed directions    Interactions: Interacted appropriately    Mental Status: Calm    Behavior/appearance: Attentive    Goals Achieved: Able to listen to others        Steff Wright

## 2021-01-26 NOTE — BSMART NOTE
OCCUPATIONAL THERAPY PROGRESS NOTE  Group Time:  1400  Attendance: The patient attended full group. .  Participation:  The patient participated with moderate elaboration in the activity. Attention:  The patient was able to focus on the activity. Interaction:  The patient occasionally  interacts with others. No insight into own behaviors with family or need to change self. Answers to activity appropriate, but, vague in detail.

## 2021-01-26 NOTE — BSMART NOTE
ART THERAPY GROUP PROGRESS NOTE    PATIENT SCHEDULED FOR GROUP AT: 1300    ATTENDANCE: Full    PARTICIPATION LEVEL:  Participates fully in the art process    ATTENTION LEVEL : Able to focus on task    FOCUS: [address]lier: He initially had an irritable edge, however became calmer and more compliant as he worked. He was occasionally attention-seeking at times and appeared to say things out of reaction from group members, however was responsive to re-direction and overall invested in the task at hand.

## 2021-01-27 PROCEDURE — 99231 SBSQ HOSP IP/OBS SF/LOW 25: CPT | Performed by: PSYCHIATRY & NEUROLOGY

## 2021-01-27 PROCEDURE — 74011250637 HC RX REV CODE- 250/637: Performed by: PSYCHIATRY & NEUROLOGY

## 2021-01-27 PROCEDURE — 65220000003 HC RM SEMIPRIVATE PSYCH

## 2021-01-27 RX ORDER — BENZTROPINE MESYLATE 1 MG/1
1 TABLET ORAL
Status: DISCONTINUED | OUTPATIENT
Start: 2021-01-27 | End: 2021-01-29 | Stop reason: HOSPADM

## 2021-01-27 RX ADMIN — RISPERIDONE 3 MG: 2 TABLET ORAL at 20:21

## 2021-01-27 NOTE — BH NOTES
The patient was monitored for safety. Affect was continues to be entitled, anxious, and sometimes disrespectful. At times pt was witnessed laughing to himself. Pt was witnessed by the staff making verbal threats because of not be able to be discharged. Pt did not act on any of his threats and was able to keep control of his behavior. Pt did talk with his DR and SW.  Pt did socialize with his peers. Pt was able to attend his groups and activities. Staff will continue to monitor for safety and locations.

## 2021-01-27 NOTE — PROGRESS NOTES
conducted an Spirituality Group for DISHA Beasley, who is a 22 y. o.,male. Patient's Primary Language is: [place]. According to the patient's EMR Quaker Affiliation is: No preference. The reason the Patient came to the hospital is:   Patient Active Problem List    Diagnosis Date Noted    Schizophreniform disorder ([facility] 01/22/2021    [facility], [facility]/21[facility]    Psychosis ([facility] 01/20/2021       conducted Spirituality Group: Ex#[id number] World on My Shoulders; patient was one of 7 participants/9 on Floor. The  provided the following Interventions:  Initiated a relationship of care and support. Listened empathically. Discussed challenges of life situations and possible opportunities of being in treatment and spiritual care. Offered prayer and assurance of continued prayer on patient's behalf. The following outcomes were achieved:  Patient shared much in group discussion thoughts and life concerns, and potential resolutions.  provided spiritual ideas in life coping skills. Patient expressed gratitude for chaplains' visit and prayer. Assessment:  There are no known further spiritual or Oriental orthodox issues which require Spiritual Care Services interventions at this time. Plan:  Chaplains will continue to follow and will provide pastoral care on an as needed/requested basis. Chaplains will follow up with spiritual material as requested.  recommends bedside caregivers page  on duty if patient shows signs of acute spiritual or emotional distress.      [address] [place] [address]   [phone]

## 2021-01-27 NOTE — BSMART NOTE
ART THERAPY GROUP PROGRESS NOTE    PATIENT SCHEDULED FOR GROUP AT: 9058    ATTENDANCE: Full    PARTICIPATION LEVEL: Participates fully in the art process    ATTENTION LEVEL : Able to focus on task    FOCUS: Goals    SYMBOLIC & THEMATIC CONTENT AS NOTED IN IMAGERY: He was calm and presented with a brighter affect than noted in yesterday's group. He was invested in the task at hand and participated minimally in group discussion.

## 2021-01-27 NOTE — GROUP NOTE
ANN  GROUP DOCUMENTATION INDIVIDUAL                                                                          Group Therapy Note    Date: 1/27/2021    Group Start Time: 1300  Group End Time: 9816  Group Topic: Social Work Group    [address] ADULT [facility] [place], [address] GROUP DOCUMENTATION GROUP    Group Therapy Note    Attendees: 4         Attendance: Attended      Interventions/techniques: Informed    Follows Directions:  Followed directions    Interactions: Interacted appropriately    Mental Status: Calm    Behavior/appearance: Attentive    Goals Achieved: Able to manage/cope with feelings    Cheril Ort

## 2021-01-27 NOTE — BH NOTES
Pt c/o dryness to nose and nose bleeding from dryness. Pt informed this is possibly an anticholinergic side effect of cogentin. As a result, pt refused hs dose of cogentin. Pt informed to be aware of eps and was educated on s/s of eps. Pt was also advised to discuss further with Dr. Robby Whittaker tomorrow. Pt did take hs Risperdal.  Will continue to monitor.

## 2021-01-27 NOTE — GROUP NOTE
IP  GROUP DOCUMENTATION INDIVIDUAL                                                                          Group Therapy Note    Date: 1/26/2021    Group Start Time: 2000  Group End Time: 2030  Group Topic: Nursing    [facility] SPECIAL TRTMT 1    Kimper Bodily    IP  GROUP DOCUMENTATION GROUP    Group Therapy Note    Attendees: 5         Attendance: Attended          Interventions/techniques: Informed    Follows Directions:  Followed directions    Interactions: Interacted appropriately    Mental Status: Calm    Behavior/appearance: Cooperative    Goals Achieved: Able to listen to others            Aaron Silva

## 2021-01-27 NOTE — PROGRESS NOTES
[address] [facility] [address]  Inpatient Progress Note     Date of Service: 01/27/21  Hospital Day: 7     Subjective/Interval History   01/27/21    Treatment Team Notes:  Notes reviewed and/or discussed and report that Tala Hunt is a 26-year-old male admitted under involuntary commitment for paranoid and aggressive behavior. Per nursing report, patient is sarcastic and disrespectful at times. However, he is pleasant most of the times and has been attending groups. Patient interview: Tala Hunt was interviewed by this writer today. Patient was requesting discharge. We will continue to address behavior that led to hospitalization with patient still showing limited insight. However, he says he will apologize to his mother when he gets home and he will work on improving his behavior. We discussed coping skills especially ways to manage his anger. Patient plans to exercise and also to just walk away when he finds himself in situations where he is getting angry. The patient was calm during the initial part of the interview but as the interview progressed and he realized he was not going to be discharged today, he got more upset and irritable. He is taking Risperdal as prescribed. The dose was increased yesterday. Patient says he refused to take Cogentin last night because he was informed it could be causing dryness in his nose and causing nose bleeding. Objective     Visit Vitals  /78   Pulse 72   Temp 96.9 °F (36.1 °C)   Resp 18   Ht 5' 5\" (1.651 m)   Wt 54 kg (119 lb)   BMI 19.80 kg/m²       No results found for this or any previous visit (from the past 24 hour(s)). Mental Status Examination     Appearance/Hygiene 22 y.o.  BLACK OR  male  Hygiene: Fair   Behavior/Social Relatedness Appropriate   Musculoskeletal Gait/Station: appropriate  Tone (flaccid, cogwheeling, spastic): not assessed  Psychomotor (hyperkinetic, hypokinetic): calm   Involuntary movements (tics, dyskinesias, akathisa, stereotypies): none   Speech   Rate, rhythm, volume, fluency and articulation are appropriate   Mood   euthymic and irritable   Affect    congruent   Thought Process Linear and goal directed   Thought Content and Perceptual Disturbances Denies self-injurious behavior (SIB), suicidal ideation (SI), aggressive behavior or homicidal ideation (HI)    Denies auditory and visual hallucinations   Sensorium and Cognition  Grossly intact   Insight  Limited   Judgment  Limited        Assessment/Plan      Psychiatric Diagnoses:   Patient Active Problem List   Diagnosis Code    Psychosis ([facility] F29    Cannabis use disorder, moderate, dependence ([facility] F12.20    Schizophreniform disorder ([facility] F20.81       Level of impairment/disability: Mild to moderate    Alvino Soto is a 22 y.o. who is currently admitted for paranoia and seems to be doing better. 1.  Continue Risperdal  3 mg at bedtime. Decrease Cogentin to 1 mg at bedtime  2. Reviewed instructions, risks, benefits and side effects of medications. 3.  Supportive therapy. 4.  Disposition/Discharge Date: self-care, home. Another family session with parents planned for tomorrow.     Kavitha Mccormack MD DR. [facility]  Psychiatry

## 2021-01-27 NOTE — BH NOTES
Patient presented himself as cooperative and engaging when approached. Although patient was notably irritable after being informed that he would not be discharged today, he spent the majority of this shift in a fairly pleasant mood. Patient participated during group with an enthusiasm that has been consistent since his admission. Patient has proven to be insightful and eager to engage in thought-provoking conversations. Patient expressed his concerns to staff accordingly, and has maintained compliance with treatment recommendations made. Patient will continue to be monitored by staff for any change in mood and behavior.

## 2021-01-27 NOTE — BSMART NOTE
Clinical Summary: 22year-old male with history of asthma who was brought by  under an ECO.  Per nurse, patient's mother requested ECO because patient is hearing voices and had an angry demeanor towards her. SW made contact with patient as he engaged with peers in the milieu. Patient continues to display behaviors which appear to be childish and angry. SW addressed concerns with patient and encouraged him to display appropriate behaviors. SW addressed the ability to address concerns with parents for family session. SW provided patient with communication and coping skills. SW praised patient for attending group and participating with addressing feelings. SW will continue to monitor patient and will assist with discharge plan.     Juan Lou, FRANCES-SHRUTI

## 2021-01-27 NOTE — BSMART NOTE
OCCUPATIONAL THERAPY PROGRESS NOTE  Group Time:  1400  Attendance: The patient attended full group. Participation:  The patient participated fully in the activity. Attention:  The patient needed redirection to activity at least once. Interaction:  The patient occasionally  interacts with others. Mood brighter. Responses generally appropriate.

## 2021-01-27 NOTE — GROUP NOTE
ANN  GROUP DOCUMENTATION INDIVIDUAL                                                                          Group Therapy Note    Date: 1/27/2021    Group Start Time: 0830  Group End Time: 0845  Group Topic: Nursing    [facility] SPECIAL TRT[facility] Rodolfo Dunne, RN    IP [facility] GROUP DOCUMENTATION GROUP    Group Therapy Note    Attendees: 5         Attendance: Attended    Patient's Goal:  Understanding Mental Health Relapse    Interventions/techniques: Informed    Follows Directions: Followed directions    Interactions: Interacted appropriately    Mental Status: Calm    Behavior/appearance: Cooperative    Goals Achieved: Able to engage in interactions and Able to listen to others      Additional Notes:  Patient discussed goals for the future, talked about positive coping skills.       Jess Conway RN

## 2021-01-27 NOTE — PROGRESS NOTES
Problem: Psychosis  Goal: *STG: Decreased hallucinations  Description: there will be a decrease in delusional thinking will have a decrease in delusional  thinking. Outcome: Progressing Towards Goal  Goal: *STG: Decreased delusional thinking  Outcome: Progressing Towards Goal  Goal: *STG: Remains safe in hospital  Outcome: Progressing Towards Goal   Camila Ye is a 22 y.o. Male that presented today as mainly cooperative; denies si/hi and avh. Camila Ye has been group and meal compliant; refused cogentin for same reason as previous. Was again educated on s/s of eps and informed to notify nursing if he experieces any s/s of eps  RN's will initiate, develop, implement, review, and revise treatment plans as necessary. Will continue to provide education, redirection, and support as needed or verbalized by patient.    Signed By: Sandip eFrnandez     January 27, 2021

## 2021-01-27 NOTE — BSMART NOTE
SOCIAL WORK GROUP THERAPY PROGRESS NOTE    Group Time:  10:15am      Group Topic:  Coping Skills    C D Issues    Group Participation:      Pt moderately involved during group discussion but remained attentive. His affect still restricted some but volunteered to read parts of handout. Reviewed strategies to keep a \"Journal\" for moods, cognitions, behavior & outcome. Did handout on  x25 ways to be better in managing \"anxiety\". Identified several strategies he needs to use to stay more positive, although limited disclosure about family issues.

## 2021-01-28 PROCEDURE — 74011250637 HC RX REV CODE- 250/637: Performed by: PSYCHIATRY & NEUROLOGY

## 2021-01-28 PROCEDURE — 65220000003 HC RM SEMIPRIVATE PSYCH

## 2021-01-28 PROCEDURE — 99231 SBSQ HOSP IP/OBS SF/LOW 25: CPT | Performed by: PSYCHIATRY & NEUROLOGY

## 2021-01-28 RX ADMIN — RISPERIDONE 3 MG: 2 TABLET ORAL at 20:36

## 2021-01-28 RX ADMIN — BENZTROPINE MESYLATE 1 MG: 1 TABLET ORAL at 20:36

## 2021-01-28 NOTE — PROGRESS NOTES
Problem: Falls - Risk of  Goal: *Absence of Falls  Description: Document Terry Carver Fall Risk and appropriate interventions in the flowsheet. Outcome: Progressing Towards Goal  Note: Fall Risk Interventions:              Problem: Psychosis  Goal: *STG: Decreased hallucinations  Description: there will be a decrease in delusional thinking will have a decrease in delusional  thinking. Outcome: Progressing Towards Goal       Pt presents euthymic, cooperative, and calm. Pt was the target of another pt's delusional outbursts and he was able to maintain his composure and not respond negatively. Pt was commended for his response. Pt denies si/hi and avh, and has been compliant with his treatment plan.   Will continue to support

## 2021-01-28 NOTE — PROGRESS NOTES
[address]  Inpatient Progress Note     Date of Service: 01/28/21  Hospital Day: 8     Subjective/Interval History   01/28/21    Treatment Team Notes:  Notes reviewed and/or discussed and report that Iam Grande is a 80-year-old male admitted under involuntary commitment for paranoid and aggressive behavior. Per nursing report, patient is sarcastic and disrespectful at times. However, he is pleasant most of the times and has been attending groups. Patient interview: Iam Grande was interviewed by this writer today. Patient was requesting discharge. Patient reports euthymic mood. His affect is bright. He denies feeling paranoid. He denies auditory or visual hallucinations. Continue to provide supportive therapy to discuss coping skills and anger management. Also provided education on negative effects of marijuana on his brain and mental state. Objective     Visit Vitals  /82   Pulse 65   Temp 97.4 °F (36.3 °C)   Resp 20   Ht 5' 5\" (1.651 m)   Wt 54 kg (119 lb)   BMI 19.80 kg/m²       No results found for this or any previous visit (from the past 24 hour(s)). Mental Status Examination     Appearance/Hygiene 22 y.o.  BLACK OR  male  Hygiene: Fair   Behavior/Social Relatedness Appropriate   Musculoskeletal Gait/Station: appropriate  Tone (flaccid, cogwheeling, spastic): not assessed  Psychomotor (hyperkinetic, hypokinetic): calm   Involuntary movements (tics, dyskinesias, akathisa, stereotypies): none   Speech   Rate, rhythm, volume, fluency and articulation are appropriate   Mood   euthymic and irritable   Affect    congruent   Thought Process Linear and goal directed   Thought Content and Perceptual Disturbances Denies self-injurious behavior (SIB), suicidal ideation (SI), aggressive behavior or homicidal ideation (HI)    Denies auditory and visual hallucinations   Sensorium and Cognition  Grossly intact   Insight  Limited   Judgment Limited        Assessment/Plan      Psychiatric Diagnoses:   Patient Active Problem List   Diagnosis Code    Psychosis ([facility] F29    Cannabis use disorder, moderate, dependence ([facility] F12.20    [facility] F20.81       Level of impairment/disability: Mild to moderate    Mendel Provost is a 22 y.o. who is currently admitted for paranoia and seems to be doing better. 1.  Continue current medication regimen without changes. Family session planned today with . 2.  Reviewed instructions, risks, benefits and side effects of medications. 3.  Supportive therapy. 4.  Disposition/Discharge Date: self-care, home. Discharge planning for tomorrow.     Rebel Dhaliwal MD DR. [facility]  Psychiatry

## 2021-01-28 NOTE — BH NOTES
MHT Note   Patient was calm and compliant throughout the shift. Patient followed all of staffs directions and participated in all group activities. Patient interacted in a appropriate manner during the shift. Patient was able to contract for safety during the shift. There are no falls or major issues to report at this time.

## 2021-01-28 NOTE — GROUP NOTE
ANN  GROUP DOCUMENTATION INDIVIDUAL                                                                          Group Therapy Note    Date: 1/28/2021    Group Start Time: 0730  Group End Time: 0745  Group Topic: Nursing    [facility] SPECIAL TRTMT Rodolfo Burkett [address], RN    IP [facility] GROUP DOCUMENTATION GROUP    Group Therapy Note    Attendees: 5         Attendance: Attended    Patient's Goal:  Discharge Planning     Interventions/techniques: Informed    Follows Directions: Followed directions    Interactions: Interacted appropriately    Mental Status: Calm    Behavior/appearance: Cooperative    Goals Achieved: Able to engage in interactions and Able to listen to others      Additional Notes:  Patient engaged in conversation stating his goals when he get out of hospital, discussions on positive coping skills.      Iris Duarte RN

## 2021-01-28 NOTE — BSMART NOTE
Clinical Summary: 22year-old male with history of asthma who was brought by  under an ECO.  Per nurse, patient's mother requested ECO because patient is hearing voices and had an angry demeanor towards her. Family Session:  SW made contact with parents Mr. Jovany Farrar [phone] who report patient appears to be at baseline. Family addressed concerns related to patients admission. Patient was able to articulate his thoughts and feelings regarding their relationship. Patient was polite and articulate. Parents expressed expectations regarding discharge plan. SW addressed diagnosis and treatment plan after discharge. Patient will comply with services with [address] [place] [address]. Parents reports they will support patient and continue with treatment plan as needed. SW will continue supportive counseling and will assist as needed.     Juan David Herzog, FRANCES-E

## 2021-01-28 NOTE — GROUP NOTE
ANN  GROUP DOCUMENTATION INDIVIDUAL                                                                          Group Therapy Note    Date: 1/27/2021    Group Start Time: 2000  Group End Time: 2030  Group Topic: Medication    SO [facility] SPECIAL TRTMT 1    Nafisa JUAREZ  GROUP DOCUMENTATION GROUP    Group Therapy Note    Attendees:7         Attendance: Attended    Patient's Goal:  Educated about medication compliance    Interventions/techniques: Validated    Follows Directions:  Followed directions    Interactions: Interacted appropriately    Mental Status: Congruent    Behavior/appearance: Attentive    Goals Achieved: Able to listen to others          Harish Vanegas

## 2021-01-28 NOTE — BSMART NOTE
OCCUPATIONAL THERAPY PROGRESS NOTE  Group Time:  1400  Attendance: The patient attended full group. Participation:  The patient participated fully in the activity. Attention:  The patient was able to focus on the activity. Interaction:  The patient occasionally  interacts with others. Mood continues brighter. Hoping for discharge tomorrow.

## 2021-01-28 NOTE — BSMART NOTE
SOCIAL WORK GROUP THERAPY PROGRESS NOTE    Group Time:  10:15am      Group Topic:  Coping Skills    C D Issues    Group Participation:      Pt moderately involved during group discussion but remained attentive. He seemed better focused on self vs family & others. Less dysphoric. Discussion included the process of making \"Change\" by answering questions on handout with an emphasis on strengths & weaknesses to support improving one's self esteem. Admits needs to improve self & not dictate or over react to others.

## 2021-01-28 NOTE — PROGRESS NOTES
Comprehensive Nutrition Assessment    Type and Reason for Visit: RD nutrition re-screen/LOS, Initial    Nutrition Recommendations/Plan:   -Continue Regular diet as tolerated and encourage PO intake  -Will add Ensure Clear BID w/ pt's meals for easily consumed kcal and protein  -Please continue to monitor pt's po intake and wts    Nutrition Assessment:  Pt is a 22 yr old male admitted d/t Schizophreniform disorder on 1/20/21. Pt UDS was positive for marijuana upon admission. Met w/ pt on 1/28/21 and pt was calm during initial visit. Pt stated last BM was 1/28/21 and denies nausea, vomiting, diarrhea, and constipation. Pt says appetite is better than usual and eats 25-50% of his meals. Pt says he normal weighs around 135# and noticed he was starting to lose weight. Pt states weighing 119# last November. Malnutrition Assessment:  Malnutrition Status:  Insufficient data        Estimated Daily Nutrient Needs:  Energy (kcal): 5503-9971; Weight Used for Energy Requirements: Current(54kg)  Protein (g): 43-54; Weight Used for Protein Requirements: Current(0.8.1)  Fluid (ml/day): 4894-7790; Method Used for Fluid Requirements: 1 ml/kcal      Nutrition Related Findings:  last BM 1/28/21      Wounds:    None       Current Nutrition Therapies:  DIET REGULAR Double Portions    Anthropometric Measures:  · Height:  5' 5\" (165.1 cm)  · Current Body Wt:  54 kg (119 lb 0.8 oz)   · Admission Body Wt:  120 lb    · Usual Body Wt:  61.2 kg (135 lb)(Lost wt in november 2020, down to 120#)     · Ideal Body Wt:  136 lbs:  87.5 %   · BMI Category:  Normal weight (BMI 18.5-24. 9)       Nutrition Diagnosis:   · Inadequate oral intake related to psychological cause or life stress as evidenced by intake 26-50%, weight loss 7.5% in 3 months      Nutrition Interventions:   Food and/or Nutrient Delivery: Continue current diet  Nutrition Education and Counseling: No recommendations at this time  Coordination of Nutrition Care: Continue to monitor while inpatient    Goals:  PO nutrition intake will meet >75% of patient estimated nutritional needs within the next 7 days.        Nutrition Monitoring and Evaluation:   Behavioral-Environmental Outcomes: None identified  Food/Nutrient Intake Outcomes: Food and nutrient intake  Physical Signs/Symptoms Outcomes: None identified    Discharge Planning:    No discharge needs at this time     Electronically signed by Bronson Meeks RD on 1/28/2021 at 11:58 AM    Contact: [phone]

## 2021-01-29 VITALS
WEIGHT: 119 LBS | HEART RATE: 67 BPM | SYSTOLIC BLOOD PRESSURE: 103 MMHG | DIASTOLIC BLOOD PRESSURE: 60 MMHG | RESPIRATION RATE: 18 BRPM | BODY MASS INDEX: 19.83 KG/M2 | HEIGHT: 65 IN | TEMPERATURE: 96.7 F

## 2021-01-29 PROBLEM — F29 PSYCHOSIS (HCC): Status: RESOLVED | Noted: 2021-01-20 | Resolved: 2021-01-29

## 2021-01-29 PROCEDURE — 99239 HOSP IP/OBS DSCHRG MGMT >30: CPT | Performed by: PSYCHIATRY & NEUROLOGY

## 2021-01-29 RX ORDER — RISPERIDONE 3 MG/1
3 TABLET, FILM COATED ORAL
Qty: 30 TAB | Refills: 0 | Status: SHIPPED | OUTPATIENT
Start: 2021-01-29

## 2021-01-29 NOTE — DISCHARGE INSTRUCTIONS
BEHAVIORAL HEALTH NURSING DISCHARGE NOTE      The following personal items collected during your admission are returned to you:   Dental Appliance:    Vision:    Hearing Aid:    Jewelry:    Clothing: Clothing: Footwear, Hat, Jacket/Coat, Pants, Shirt, Undergarments  Other Valuables: Other Valuables: Cell Phone, Eyeglasses, Other (comment)([employer] ID shoe strings )  Valuables sent to safe: Personal Items Sent to Safe: ($50 in cash in security )      PATIENT INSTRUCTIONS:             The discharge information has been reviewed with the patient. The patient verbalized understanding.         Patient armband removed and shredded

## 2021-01-29 NOTE — PROGRESS NOTES
Patient received discharge instructions, verbalized understanding of follow up appt. Patient declined flu vaccine.
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
## 2021-01-29 NOTE — GROUP NOTE
ANN  GROUP DOCUMENTATION INDIVIDUAL                                                                          Group Therapy Note    Date: 1/29/2021    Group Start Time: 1945  Group End Time: 2000  Group Topic: Medication    [facility] SPECIAL TRT 1    Jeannette Greco RN     [facility] GROUP    Group Therapy Note    Attendees: 4         Attendance: Did not attend    Additional Notes:  Patient asleep    Miladis Rubin RN

## 2021-01-29 NOTE — GROUP NOTE
ANN  GROUP DOCUMENTATION INDIVIDUAL                                                                          Group Therapy Note    Date: 1/29/2021    Group Start Time: 0800  Group End Time: 0815  Group Topic: Nursing    [facility] SPECIAL T[facility] Rodolfo Dunne, RN     [facility] GROUP    Group Therapy Note    Attendees: 3         Attendance: Did not attend        Rolando Tillman RN

## 2021-01-29 NOTE — BSMART NOTE
LATE ENTRY 1/29/2021  ART THERAPY GROUP PROGRESS NOTE    PATIENT SCHEDULED FOR GROUP AT: 200    ATTENDANCE: Full    PARTICIPATION LEVEL: Participates in the art process; Needs only minimal encouragement;     ATTENTION LEVEL : Needs occasional re-direction    FOCUS: Grounding techniques    SYMBOLIC & THEMATIC CONTENT AS NOTED IN IMAGERY: He was calm and compliant. He responded to redirection to task from art therapy intern. He finished the task quickly, and his approach was slightly disorganized but otherwise planned. He referred to the \"flower\" he tmamy in a previous art therapy group. He claimed that \"it's coming together\" and \"let go, let God. \" He was focused on watching TV and \"learning something new. \"    Group administered by art therapy intern.